# Patient Record
Sex: MALE | NOT HISPANIC OR LATINO
[De-identification: names, ages, dates, MRNs, and addresses within clinical notes are randomized per-mention and may not be internally consistent; named-entity substitution may affect disease eponyms.]

---

## 2022-11-08 ENCOUNTER — NON-APPOINTMENT (OUTPATIENT)
Age: 27
End: 2022-11-08

## 2022-11-10 ENCOUNTER — APPOINTMENT (OUTPATIENT)
Dept: UROLOGY | Facility: CLINIC | Age: 27
End: 2022-11-10

## 2022-11-10 VITALS
OXYGEN SATURATION: 97 % | BODY MASS INDEX: 26.03 KG/M2 | DIASTOLIC BLOOD PRESSURE: 80 MMHG | HEART RATE: 74 BPM | SYSTOLIC BLOOD PRESSURE: 144 MMHG | HEIGHT: 77.17 IN | WEIGHT: 220.46 LBS | TEMPERATURE: 98.1 F

## 2022-11-10 VITALS — SYSTOLIC BLOOD PRESSURE: 127 MMHG | DIASTOLIC BLOOD PRESSURE: 77 MMHG

## 2022-11-10 DIAGNOSIS — Z80.42 FAMILY HISTORY OF MALIGNANT NEOPLASM OF PROSTATE: ICD-10-CM

## 2022-11-10 DIAGNOSIS — Z78.9 OTHER SPECIFIED HEALTH STATUS: ICD-10-CM

## 2022-11-10 PROBLEM — Z00.00 ENCOUNTER FOR PREVENTIVE HEALTH EXAMINATION: Status: ACTIVE | Noted: 2022-11-10

## 2022-11-10 PROCEDURE — 99204 OFFICE O/P NEW MOD 45 MIN: CPT

## 2022-11-10 RX ORDER — LEVOTHYROXINE SODIUM 100 UG/1
100 TABLET ORAL
Refills: 0 | Status: ACTIVE | COMMUNITY

## 2022-11-10 NOTE — HISTORY OF PRESENT ILLNESS
[FreeTextEntry1] : Chief Complaint: Hematuria\par Date of first visit: 11/10/2022\par Occupation: \par Moved from [place] 2 weeks ago\par  \par \par AC CHEN  is a 27 year old [place] man with PMHx sickle cell TRAIT and hypothyroidism who presents for hematuria. This is not a first time occurrence. Happened at age 15 after a fall (hospitalized, transfused, almost had nephrectomy, cystoscopy negative per pt). Happened again at age 22 after intense exercise (no infx but given abx anyways, hydrated and resolved). Occurred again in August 2021 (hydrated and resolved, renal US at that time demonstrated no stones + trace bilateral hydro). He has seen a urologist and nephrologist in [place] and had "a bunch of exams". States it has been chalked up to having sickle cell trait and has been instructed to hydrate well, take iron, eat green leafy vegetables, and avoid high altitudes. \par \par This episode started yesterday morning. He went to [facility] ER. CT non con was performed and demonstrated "no obstructing stones or hydro. ill-defined punctate hyperdensity at the right lower pole, nonspecific may reflect a nonobstructing renal stone or sequelae of renal papillary necrosis". Leading up to this event, he had not been hydrating adequately and reports doing extensive physical works ~2 weeks ago with his move to [facility]. The blood is throughout his entire stream (cherry red and clear, no clots). Denies dysuria, weak flow, flank pain, fever, or incomplete emptying. He has no hx of UTI, smoking, 2nd hand exposure, chemical exposure, kidney stones, or FamHx of kidney or bladder cancer. He does have FamHx of prostate cancer.\par \par 11/9/22 CT non con-  no obstructing stones or hydro. ill-defined punctate hyperdensity at the right lower pole, nonspecific may reflect a nonobstructing renal stone or sequelae of renal papillary necrosis\par bilateral pars interarticularis defect at L5 with grade 1 anterolisthesis of L5 on S1\par \par 8/10/21 renal US- trace bilateral hydro, no stones or lesions\par  \par 11/9/22 ER labs reviewed\par Hgb 14.7\par Hct 42.1\par WBC 4.16\par \par \par UA with protein and >50 RBC/hpf\par Cr 1.54, GFR 60-69\par \par 11/10/2022\par IPSS 5 QOL 3- on frequency (staying very well hydrated)\par THANH 25\par \par The patient denies fevers, chills, nausea and or vomiting and no unexplained weight loss.\par \par All pertinent laboratory, films and physician notes were reviewed.  Questionnaire results were discussed with patient.

## 2022-11-10 NOTE — PHYSICAL EXAM
[General Appearance - Well Developed] : well developed [General Appearance - Well Nourished] : well nourished [Normal Appearance] : normal appearance [Well Groomed] : well groomed [General Appearance - In No Acute Distress] : no acute distress [Edema] : no peripheral edema [Respiration, Rhythm And Depth] : normal respiratory rhythm and effort [Exaggerated Use Of Accessory Muscles For Inspiration] : no accessory muscle use [Abdomen Soft] : soft [Abdomen Tenderness] : non-tender [] : no hepato-splenomegaly [Costovertebral Angle Tenderness] : no ~M costovertebral angle tenderness [Normal Station and Gait] : the gait and station were normal for the patient's age [No Focal Deficits] : no focal deficits [Oriented To Time, Place, And Person] : oriented to person, place, and time [Affect] : the affect was normal [Mood] : the mood was normal [Not Anxious] : not anxious [No Palpable Adenopathy] : no palpable adenopathy

## 2022-11-10 NOTE — ASSESSMENT
[FreeTextEntry1] : 28 yo male with PMHx sickle cell traint and hypothyroidism with painless gross hematuria throughout urinary stream x1 day (not first time episode). No infectious symptoms. CT non con in ER 11/9/22 demonstrates "no obstructing stones or hydro. ill-defined punctate hyperdensity at the right lower pole, nonspecific may reflect a nonobstructing renal stone or sequelae of renal papillary necrosis". \par \par 1. ER labs reviewed- Cr 1.54, H&H 14.7/42.1. Repeat BMP \par 2. UA, UCx, cytology (unable to dip due to hematuria)\par 3. CT Urogram\par 4. Consider nephrology referral\par 5. Encouraged hydration and advised on return precautions\par 6. Repeat /77\par 7. He will obtain images from ER \par 8. Consider CT angio\par \par Follow up Monday 11/14/22 with Dr Recinos for image review and cystoscopy\par \par \par \par

## 2022-11-11 ENCOUNTER — RESULT REVIEW (OUTPATIENT)
Age: 27
End: 2022-11-11

## 2022-11-11 ENCOUNTER — APPOINTMENT (OUTPATIENT)
Dept: CT IMAGING | Facility: CLINIC | Age: 27
End: 2022-11-11

## 2022-11-11 ENCOUNTER — NON-APPOINTMENT (OUTPATIENT)
Age: 27
End: 2022-11-11

## 2022-11-11 ENCOUNTER — OUTPATIENT (OUTPATIENT)
Dept: OUTPATIENT SERVICES | Facility: HOSPITAL | Age: 27
LOS: 1 days | End: 2022-11-11

## 2022-11-11 LAB
ANION GAP SERPL CALC-SCNC: 11 MMOL/L
APPEARANCE: ABNORMAL
BACTERIA: NEGATIVE
BILIRUBIN URINE: NEGATIVE
BLOOD URINE: ABNORMAL
BUN SERPL-MCNC: 25 MG/DL
CALCIUM SERPL-MCNC: 10.1 MG/DL
CHLORIDE SERPL-SCNC: 105 MMOL/L
CO2 SERPL-SCNC: 25 MMOL/L
COLOR: ABNORMAL
CREAT SERPL-MCNC: 1.07 MG/DL
EGFR: 96 ML/MIN/1.73M2
GLUCOSE QUALITATIVE U: NEGATIVE
GLUCOSE SERPL-MCNC: 85 MG/DL
HYALINE CASTS: 1 /LPF
KETONES URINE: NEGATIVE
LEUKOCYTE ESTERASE URINE: NEGATIVE
MICROSCOPIC-UA: NORMAL
NITRITE URINE: NEGATIVE
PH URINE: 6.5
POTASSIUM SERPL-SCNC: 4.5 MMOL/L
PROTEIN URINE: ABNORMAL
RED BLOOD CELLS URINE: >720 /HPF
SODIUM SERPL-SCNC: 141 MMOL/L
SPECIFIC GRAVITY URINE: 1.01
SQUAMOUS EPITHELIAL CELLS: 0 /HPF
UROBILINOGEN URINE: NORMAL
WHITE BLOOD CELLS URINE: 1 /HPF

## 2022-11-11 PROCEDURE — 74178 CT ABD&PLV WO CNTR FLWD CNTR: CPT | Mod: 26

## 2022-11-14 ENCOUNTER — APPOINTMENT (OUTPATIENT)
Dept: UROLOGY | Facility: CLINIC | Age: 27
End: 2022-11-14

## 2022-11-14 VITALS — DIASTOLIC BLOOD PRESSURE: 68 MMHG | SYSTOLIC BLOOD PRESSURE: 110 MMHG | TEMPERATURE: 98.3 F | HEART RATE: 78 BPM

## 2022-11-14 LAB — BACTERIA UR CULT: NORMAL

## 2022-11-14 PROCEDURE — 99213 OFFICE O/P EST LOW 20 MIN: CPT

## 2022-11-14 NOTE — ADDENDUM
[FreeTextEntry1] : I, Dr. Recinos, personally performed the evaluation and management (E/M) services for this new patient.  That E/M includes conducting the initial examination, assessing all conditions, and establishing the plan of care.  Today, my ACP, Kristie Her, was here to observe my evaluation and management services for this patient to be followed going forward.\par

## 2022-11-14 NOTE — ASSESSMENT
[FreeTextEntry1] : 26 yo male with PMHx sickle cell traint and hypothyroidism with painless gross hematuria throughout urinary stream x1 day (not first time episode). No infectious symptoms. CT non con in ER 11/9/22 demonstrates "no obstructing stones or hydro. ill-defined punctate hyperdensity at the right lower pole, nonspecific may reflect a nonobstructing renal stone or sequelae of renal papillary necrosis".  CT Urogram negative via verbal report from radiologist Dr Ramirez. Currently no hematuria. \par \par 1. Refer to nephrology given hx of acute renal failure/hematuria with no clear etiology\par 2. Finalized CT report pending\par 3. Cytology pending\par 4. Repeat BMP normal Cr 1.0\par 5. If it occurs again, he will need cystoscopy while actively bleeding as well as CT angio\par 6. Considering nutcrackers - but Dr BREE Erickson stated there was no renal dilation.\par \par Follow up 3 months\par \par Thank you very much for allowing me to assist in the care of this patient. Should you have any additional questions or concerns please do not hesitate to contact me.\par \par \par Sincerely,\par \par \par Yonas Recinos D.O.\par  of Urology and Radiology\par  of Urology at [facility]\par System Director for Prostate Cancer\par [address] [place] [address], [address]th Floor [facility], [address]\par Phone: [phone]\par \par \par \par \par

## 2022-11-16 LAB — URINE CYTOLOGY: NORMAL

## 2022-12-01 ENCOUNTER — APPOINTMENT (OUTPATIENT)
Dept: NEPHROLOGY | Facility: CLINIC | Age: 27
End: 2022-12-01

## 2022-12-01 ENCOUNTER — LABORATORY RESULT (OUTPATIENT)
Age: 27
End: 2022-12-01

## 2022-12-01 VITALS
WEIGHT: 220 LBS | DIASTOLIC BLOOD PRESSURE: 74 MMHG | HEIGHT: 76 IN | SYSTOLIC BLOOD PRESSURE: 112 MMHG | HEART RATE: 76 BPM | BODY MASS INDEX: 26.79 KG/M2

## 2022-12-01 DIAGNOSIS — D57.3 SICKLE-CELL TRAIT: ICD-10-CM

## 2022-12-01 DIAGNOSIS — R94.4 ABNORMAL RESULTS OF KIDNEY FUNCTION STUDIES: ICD-10-CM

## 2022-12-01 DIAGNOSIS — E03.9 HYPOTHYROIDISM, UNSPECIFIED: ICD-10-CM

## 2022-12-01 DIAGNOSIS — R79.89 OTHER SPECIFIED ABNORMAL FINDINGS OF BLOOD CHEMISTRY: ICD-10-CM

## 2022-12-01 DIAGNOSIS — R31.0 GROSS HEMATURIA: ICD-10-CM

## 2022-12-01 PROCEDURE — 99204 OFFICE O/P NEW MOD 45 MIN: CPT

## 2022-12-01 NOTE — HISTORY OF PRESENT ILLNESS
[FreeTextEntry1] : 27-year-old man who grew up in [place], and is now a [place] citizen living in [place] -he has had 4 episodes of gross hematuria, starting at age 15, and at age 22, 26, and several weeks ago at age 27.  There is no common theme -he had not just vigorously exercised each time, he was not dehydrated.  With the latest episode, he went to the ER at [facility] -his creatinine was 1.54 with a BUN of 22 there and red cells in the urine.  CT there showed a possible right lower pole stone but no hydronephrosis.  A follow-up CT done at [facility] did not suggest a stone or any other abnormality.  He has sickle cell trait, with a hemoglobin of 14.7.  He has been hypothyroid since age 15, on Synthroid 100 mcg daily.  He does not use NSAIDs, he is a non-smoker and nondrinker.  He does not take aspirin.  He was evaluated 2 weeks ago by Dr. Recinos and no urologic issues were uncovered.  His BP has always been normal.  He does take vitamins and supplements and will forward the details to me.

## 2022-12-01 NOTE — ASSESSMENT
[FreeTextEntry1] : 27-year-old man with his fourth episode of gross hematuria in the last 12 years -etiology is not yet clear, but I suspect sickle cell trait is a catalyst, perhaps exacerbated by exercise or dehydration.  I have ordered U ACR, urinalysis, BMP, Cystatin C, and TSH.  I have asked him to see Dr. Nina Caceres from the standpoint of the role of sickle cell trait here.  I do not think he has a kidney stone that explains this.  I have reassured him that his normal blood pressure is a plus, and that his renal function appeared to normalize following the ER visit where the creatinine was elevated.

## 2022-12-02 LAB
ANION GAP SERPL CALC-SCNC: 10 MMOL/L
APPEARANCE: CLEAR
BACTERIA: NEGATIVE
BILIRUBIN URINE: NEGATIVE
BLOOD URINE: NEGATIVE
BUN SERPL-MCNC: 18 MG/DL
CALCIUM SERPL-MCNC: 10.2 MG/DL
CHLORIDE SERPL-SCNC: 104 MMOL/L
CO2 SERPL-SCNC: 28 MMOL/L
COLOR: NORMAL
CREAT SERPL-MCNC: 1.06 MG/DL
CREAT SPEC-SCNC: 103 MG/DL
EGFR: 97 ML/MIN/1.73M2
GLUCOSE QUALITATIVE U: NEGATIVE
GLUCOSE SERPL-MCNC: 95 MG/DL
HYALINE CASTS: 0 /LPF
KETONES URINE: NEGATIVE
LEUKOCYTE ESTERASE URINE: NEGATIVE
MICROALBUMIN 24H UR DL<=1MG/L-MCNC: <1.2 MG/DL
MICROALBUMIN/CREAT 24H UR-RTO: NORMAL MG/G
MICROSCOPIC-UA: NORMAL
NITRITE URINE: NEGATIVE
PH URINE: 6.5
POTASSIUM SERPL-SCNC: 4.3 MMOL/L
PROTEIN URINE: NEGATIVE
RED BLOOD CELLS URINE: 2 /HPF
SODIUM SERPL-SCNC: 142 MMOL/L
SPECIFIC GRAVITY URINE: 1.02
SQUAMOUS EPITHELIAL CELLS: 0 /HPF
TSH SERPL-ACNC: 2.11 UIU/ML
UROBILINOGEN URINE: NORMAL
WHITE BLOOD CELLS URINE: 0 /HPF

## 2022-12-05 LAB
CYSTATIN C SERPL-MCNC: 0.8 MG/L
DSDNA AB SER-ACNC: 12 IU/ML
GFR/BSA.PRED SERPLBLD CYS-BASED-ARV: 118 ML/MIN/1.73M2

## 2023-03-10 NOTE — HISTORY OF PRESENT ILLNESS
[FreeTextEntry1] : Chief Complaint: Hematuria\par Date of first visit: 11/10/2022\par Occupation: \par Moved from [place] 2 weeks ago\par  \par AC CHEN  is a 27 year old [place] man with PMHx sickle cell TRAIT and hypothyroidism who presents for hematuria. This is not a first time occurrence. Happened at age 15 after a fall (hospitalized, transfused, almost had nephrectomy, cystoscopy negative per pt). Happened again at age 22 after intense exercise (no infx but given abx anyways, hydrated and resolved). Occurred again in August 2021 (hydrated and resolved, renal US at that time demonstrated no stones + trace bilateral hydro). He has seen a urologist and nephrologist in [place] and had "a bunch of exams". States it has been chalked up to having sickle cell trait and has been instructed to hydrate well, take iron, eat green leafy vegetables, and avoid high altitudes. \par \par This episode started yesterday morning. He went to [facility] ER. CT non con was performed and demonstrated "no obstructing stones or hydro. ill-defined punctate hyperdensity at the right lower pole, nonspecific may reflect a nonobstructing renal stone or sequelae of renal papillary necrosis". Leading up to this event, he had not been hydrating adequately and reports doing extensive physical works ~2 weeks ago with his move to [facility]. The blood is throughout his entire stream (cherry red and clear, no clots). Denies dysuria, weak flow, flank pain, fever, or incomplete emptying. He has no hx of UTI, smoking, 2nd hand exposure, chemical exposure, kidney stones, or FamHx of kidney or bladder cancer. He does have FamHx of prostate cancer.\par \par Hematuria resolved 11/11/22 \par Now reports pain in LUQ if strenuously exercising\par \par 11/10/22 urine cytology negative\par \par 11/11/22 CTU at [facility]- No evidence of renal papillary necrosis. Unremarkable kidneys.\par \par 11/9/22 CT non con-  no obstructing stones or hydro. ill-defined punctate hyperdensity at the right lower pole, nonspecific may reflect a nonobstructing renal stone or sequelae of renal papillary necrosis\par bilateral pars interarticularis defect at L5 with grade 1 anterolisthesis of L5 on S1\par \par 8/10/21 renal US- trace bilateral hydro, no stones or lesions\par \par 11/10/22 Cr 1.07 \par 11/10/22 UCx negative\par \par 11/9/22 ER labs reviewed\par Hgb 14.7\par Hct 42.1\par WBC 4.16\par \par \par UA with protein and >50 RBC/hpf\par Cr 1.54, GFR 60-69\par \par 03/13/2023\par IPSS   QOL\par THANH\par \par 11/14/2022\par IPSS 4 QOL 1\par THANH 25\par \par 11/10/2022\par IPSS 5 QOL 3- on frequency (staying very well hydrated)\par THANH 25\par \par The patient denies fevers, chills, nausea and or vomiting and no unexplained weight loss.\par \par All pertinent laboratory, films and physician notes were reviewed.  Questionnaire results were discussed with patient.

## 2023-03-10 NOTE — ASSESSMENT
[FreeTextEntry1] : 28 yo male with PMHx sickle cell traint and hypothyroidism with painless gross hematuria throughout urinary stream x1 day (not first time episode). No infectious symptoms. CT non con in ER 11/9/22 demonstrates "no obstructing stones or hydro. ill-defined punctate hyperdensity at the right lower pole, nonspecific may reflect a nonobstructing renal stone or sequelae of renal papillary necrosis".  CT Urogram negative via verbal report from radiologist Dr Ramirez. Currently no hematuria. \par \par - UA \par - If gross heme occurs again, he will need cystoscopy while actively bleeding as well as CT angio\par - Considering nutcrackers - but Dr BREE Erickson stated there was no renal dilation.\par - F/u up nephrology \par \par Sincerely,\par \par \par Art ROLANDO Recinos D.O.\par Professor of Urology and Radiology\par  of Urology at [facility]\par System Director for Prostate Cancer\par [address], [address] [facility], [address]\par Phone: [phone]

## 2023-03-13 ENCOUNTER — APPOINTMENT (OUTPATIENT)
Dept: UROLOGY | Facility: CLINIC | Age: 28
End: 2023-03-13

## 2024-01-08 NOTE — HISTORY OF PRESENT ILLNESS
[FreeTextEntry1] : Chief Complaint: Hematuria\par Date of first visit: 11/10/2022\par Occupation: \par Moved from [place] 2 weeks ago\par  \par AC CHEN  is a 27 year old [place] man with PMHx sickle cell TRAIT and hypothyroidism who presents for hematuria. This is not a first time occurrence. Happened at age 15 after a fall (hospitalized, transfused, almost had nephrectomy, cystoscopy negative per pt). Happened again at age 22 after intense exercise (no infx but given abx anyways, hydrated and resolved). Occurred again in August 2021 (hydrated and resolved, renal US at that time demonstrated no stones + trace bilateral hydro). He has seen a urologist and nephrologist in [place] and had "a bunch of exams". States it has been chalked up to having sickle cell trait and has been instructed to hydrate well, take iron, eat green leafy vegetables, and avoid high altitudes. \par \par This episode started yesterday morning. He went to [facility] ER. CT non con was performed and demonstrated "no obstructing stones or hydro. ill-defined punctate hyperdensity at the right lower pole, nonspecific may reflect a nonobstructing renal stone or sequelae of renal papillary necrosis". Leading up to this event, he had not been hydrating adequately and reports doing extensive physical works ~2 weeks ago with his move to [facility]. The blood is throughout his entire stream (cherry red and clear, no clots). Denies dysuria, weak flow, flank pain, fever, or incomplete emptying. He has no hx of UTI, smoking, 2nd hand exposure, chemical exposure, kidney stones, or FamHx of kidney or bladder cancer. He does have FamHx of prostate cancer.\par \par Hematuria resolved 11/11/22 \par Now reports pain in LUQ if strenuously exercising\par \par 11/11/22 CTU at [facility]- negative. report taken over the phone by radiologist Dr Ramirez\par \par 11/9/22 CT non con-  no obstructing stones or hydro. ill-defined punctate hyperdensity at the right lower pole, nonspecific may reflect a nonobstructing renal stone or sequelae of renal papillary necrosis\par bilateral pars interarticularis defect at L5 with grade 1 anterolisthesis of L5 on S1\par \par 8/10/21 renal US- trace bilateral hydro, no stones or lesions\par \par 11/10/22 Cr 1.07 \par 11/10/22 UCx negative\par \par 11/9/22 ER labs reviewed\par Hgb 14.7\par Hct 42.1\par WBC 4.16\par \par \par UA with protein and >50 RBC/hpf\par Cr 1.54, GFR 60-69\par \par 11/14/2022\par IPSS 4 QOL 1\par THANH 25\par \par 11/10/2022\par IPSS 5 QOL 3- on frequency (staying very well hydrated)\par THANH 25\par \par The patient denies fevers, chills, nausea and or vomiting and no unexplained weight loss.\par \par All pertinent laboratory, films and physician notes were reviewed.  Questionnaire results were discussed with patient. Diet, Regular (01-06-24 @ 19:47) [Active]